# Patient Record
Sex: MALE
[De-identification: names, ages, dates, MRNs, and addresses within clinical notes are randomized per-mention and may not be internally consistent; named-entity substitution may affect disease eponyms.]

---

## 2019-06-04 ENCOUNTER — HOSPITAL ENCOUNTER (EMERGENCY)
Dept: HOSPITAL 5 - ED | Age: 29
LOS: 1 days | Discharge: HOME | End: 2019-06-05
Payer: SELF-PAY

## 2019-06-04 DIAGNOSIS — L03.317: Primary | ICD-10-CM

## 2019-06-04 DIAGNOSIS — L73.2: ICD-10-CM

## 2019-06-04 DIAGNOSIS — F17.200: ICD-10-CM

## 2019-06-04 DIAGNOSIS — L02.31: ICD-10-CM

## 2019-06-04 LAB
ALBUMIN SERPL-MCNC: 3.6 G/DL (ref 3.9–5)
ALT SERPL-CCNC: 17 UNITS/L (ref 7–56)
BASOPHILS # (AUTO): 0.1 K/MM3 (ref 0–0.1)
BASOPHILS NFR BLD AUTO: 0.6 % (ref 0–1.8)
BILIRUB DIRECT SERPL-MCNC: < 0.2 MG/DL (ref 0–0.2)
BUN SERPL-MCNC: 10 MG/DL (ref 9–20)
BUN/CREAT SERPL: 14 %
CALCIUM SERPL-MCNC: 9 MG/DL (ref 8.4–10.2)
EOSINOPHIL # BLD AUTO: 0.3 K/MM3 (ref 0–0.4)
EOSINOPHIL NFR BLD AUTO: 2 % (ref 0–4.3)
HCT VFR BLD CALC: 44.1 % (ref 35.5–45.6)
HEMOLYSIS INDEX: 14
HGB BLD-MCNC: 15.5 GM/DL (ref 11.8–15.2)
LYMPHOCYTES # BLD AUTO: 2.2 K/MM3 (ref 1.2–5.4)
LYMPHOCYTES NFR BLD AUTO: 17 % (ref 13.4–35)
MCHC RBC AUTO-ENTMCNC: 35 % (ref 32–34)
MCV RBC AUTO: 94 FL (ref 84–94)
MONOCYTES # (AUTO): 1.2 K/MM3 (ref 0–0.8)
MONOCYTES % (AUTO): 9.4 % (ref 0–7.3)
PLATELET # BLD: 226 K/MM3 (ref 140–440)
RBC # BLD AUTO: 4.71 M/MM3 (ref 3.65–5.03)

## 2019-06-04 PROCEDURE — 85025 COMPLETE CBC W/AUTO DIFF WBC: CPT

## 2019-06-04 PROCEDURE — 10060 I&D ABSCESS SIMPLE/SINGLE: CPT

## 2019-06-04 PROCEDURE — 80076 HEPATIC FUNCTION PANEL: CPT

## 2019-06-04 PROCEDURE — 96375 TX/PRO/DX INJ NEW DRUG ADDON: CPT

## 2019-06-04 PROCEDURE — 96365 THER/PROPH/DIAG IV INF INIT: CPT

## 2019-06-04 PROCEDURE — 36415 COLL VENOUS BLD VENIPUNCTURE: CPT

## 2019-06-04 PROCEDURE — 80048 BASIC METABOLIC PNL TOTAL CA: CPT

## 2019-06-04 PROCEDURE — 99283 EMERGENCY DEPT VISIT LOW MDM: CPT

## 2019-06-04 PROCEDURE — 87040 BLOOD CULTURE FOR BACTERIA: CPT

## 2019-06-04 NOTE — EMERGENCY DEPARTMENT REPORT
Chief Complaint: Skin/Abscess/Foreign Body


Stated Complaint: LT LEG PAIN (INFECTION)


Time Seen by Provider: 06/04/19 18:53





- HPI


History of Present Illness: 





This is a 29 y.o. M. that presents to the ER with an abscess to left buttock x 1

week.





Patient states he was incarcerated when it appeared.





He was taking antibiotics and pain medication.





Patient states it was improving while there but worse when he got and no longer 

taking antibiotics.


MSE screening note: 


Focused history and physical exam performed.


Due to findings the following was ordered:





This initial assessment/diagnostic orders/clinical plan/treatment(s) is/are 

subject to change based on patient's health status, clinical progression and re-

assessment by fellow clinical providers in the ED.  Further treatment and workup

at subsequent clinical providers discretion.  Patient/guardians urged not to 

elope from the ED as their condition may be serious if not clinically assessed 

and managed.  Initial orders include:





ACC for further evaluation.





ED Disposition for MSE


Condition: Stable

## 2019-06-05 VITALS — SYSTOLIC BLOOD PRESSURE: 106 MMHG | DIASTOLIC BLOOD PRESSURE: 70 MMHG

## 2019-06-05 NOTE — EMERGENCY DEPARTMENT REPORT
- General


Chief complaint: Skin/Abscess/Foreign Body


Stated complaint: LT LEG PAIN (INFECTION)


Time Seen by Provider: 06/04/19 18:53


Source: patient


Mode of arrival: Ambulatory


Limitations: No Limitations





- History of Present Illness


Initial comments: 





Patient is 29-year-old white male with no past medical history presents to the 

ED, an acute onset persistent severe pain and swelling and erythematous macular 

rash on his left buttocks the last 1 week possibly longer.  Patient states that 

in the last 2 days the swelling and the pain has worsened.  Patient denies 

dizziness, fever, chills, nausea, vomiting, numbness and tingling of lower 

extremities bilaterally, dizziness or abdominal pain.


MD complaint: rash, insect bite/sting, abscess/boil (buttocks)


-: Sudden, week(s) (1)


Tetanus Up to Date: yes


Location: buttocks (left)


Severity: severe


Severity scale (0 -10): 8


Quality: aching, sharp, constant


Consistency: constant


Improves with: none


Worsens with: palpation, movement


Context: other (unknown)


Associated symptoms: itching, malaise, athralgias


Treatments Prior to Arrival: none, attempted to drain pus at





- Related Data


                                  Previous Rx's











 Medication  Instructions  Recorded  Last Taken  Type


 


Acetaminophen/Codeine [Tylenol 1 tab PO Q6H PRN #15 tab 06/05/19 Unknown Rx





/Codeine # 3 tab]    


 


Clindamycin [Clindamycin CAP] 300 mg PO Q8HR #60 capsule 06/05/19 Unknown Rx


 


Ibuprofen [Motrin] 400 mg PO Q8H PRN #20 tablet 06/05/19 Unknown Rx


 


Ondansetron [Zofran Odt] 4 mg PO Q6HR #15 tab.rapdis 06/05/19 Unknown Rx


 


Sulfamethoxazole/Trimethoprim 1 each PO Q12H #20 tablet 06/05/19 Unknown Rx





[Bactrim DS TAB]    











                                    Allergies











Allergy/AdvReac Type Severity Reaction Status Date / Time


 


No Known Allergies Allergy   Unverified 06/04/19 22:16














Abscess Boil HPI





- HPI


Chief Complaint: Skin/Abscess/Foreign Body


Stated Complaint: LT LEG PAIN (INFECTION)


Time Seen by Provider: 06/04/19 18:53


Duration: >1 Week


Location: Other (left gluteus)


Severity: Severe


History: Yes Pain, Yes Purulent Drainage, Yes Previous History, No Fever, No 

Numbness, No Foreign Body, No Insect Bite


HPI: Patient is 29-year-old white male with no past medical history presents to 

the ED, an acute onset persistent severe pain and swelling and erythematous 

macular rash on his left buttocks the last 1 week possibly longer.  Patient 

states that in the last 2 days the swelling and the pain has worsened.  Patient 

denies dizziness, fever, chills, nausea, vomiting, numbness and tingling of 

lower extremities bilaterally, dizziness or abdominal pain.


Home Medications: 


                                  Previous Rx's











 Medication  Instructions  Recorded  Last Taken  Type


 


Acetaminophen/Codeine [Tylenol 1 tab PO Q6H PRN #15 tab 06/05/19 Unknown Rx





/Codeine # 3 tab]    


 


Clindamycin [Clindamycin CAP] 300 mg PO Q8HR #60 capsule 06/05/19 Unknown Rx


 


Ibuprofen [Motrin] 400 mg PO Q8H PRN #20 tablet 06/05/19 Unknown Rx


 


Ondansetron [Zofran Odt] 4 mg PO Q6HR #15 tab.rapdis 06/05/19 Unknown Rx


 


Sulfamethoxazole/Trimethoprim 1 each PO Q12H #20 tablet 06/05/19 Unknown Rx





[Bactrim DS TAB]    











Allergies/Adverse Reactions: 


                                    Allergies











Allergy/AdvReac Type Severity Reaction Status Date / Time


 


No Known Allergies Allergy   Unverified 06/04/19 22:16














ED Review of Systems


ROS: 


Stated complaint: LT LEG PAIN (INFECTION)


Other details as noted in HPI





Comment: All other systems reviewed and negative


Constitutional: no symptoms reported, see HPI, diaphoresis, malaise


Eyes: as per HPI.  denies: eye pain, eye discharge, vision change


ENT: as per HPI.  denies: ear pain, throat pain, dental pain, hearing loss, 

congestion


Respiratory: no symptoms reported, see HPI.  denies: cough, orthopnea, shortness

of breath, SOB with exertion, SOB at rest


Cardiovascular: as per HPI.  denies: chest pain, palpitations, dyspnea on 

exertion, edema, syncope, paroxysmal nocturnal dyspnea


Endocrine: no symptoms reported, see HPI.  denies: excessive sweating, flushing,

intolerance to cold, intolerance to heat, increased hunger, increased thirst, 

increased urine, unexplained weight gain


Gastrointestinal: as per HPI.  denies: abdominal pain, nausea, vomiting, 

diarrhea, constipation, hematemesis, melena, hematochezia


Genitourinary: as per HPI.  denies: urgency, dysuria, frequency, hematuria, 

discharge, testicular pain, testicular mass


Musculoskeletal: as per HPI, arthralgia, myalgia


Skin: as per HPI, rash, change in color (erythematous swollen fluctuant  rash), 

pruritus


Neurological: as per HPI.  denies: headache, weakness, numbness, paresthesias, 

confusion, abnormal gait, vertigo


Psychiatric: as per HPI.  denies: auditory hallucinations, visual 

hallucinations, homicidal thoughts


Hematological/Lymphatic: as per HPI





ED Past Medical Hx





- Past Medical History


Previous Medical History?: No





- Surgical History


Past Surgical History?: No





- Social History


Smoking Status: Current Every Day Smoker


Substance Use Type: None





- Medications


Home Medications: 


                                Home Medications











 Medication  Instructions  Recorded  Confirmed  Last Taken  Type


 


Acetaminophen/Codeine [Tylenol 1 tab PO Q6H PRN #15 tab 06/05/19  Unknown Rx





/Codeine # 3 tab]     


 


Clindamycin [Clindamycin CAP] 300 mg PO Q8HR #60 capsule 06/05/19  Unknown Rx


 


Ibuprofen [Motrin] 400 mg PO Q8H PRN #20 tablet 06/05/19  Unknown Rx


 


Ondansetron [Zofran Odt] 4 mg PO Q6HR #15 tab.rapdis 06/05/19  Unknown Rx


 


Sulfamethoxazole/Trimethoprim 1 each PO Q12H #20 tablet 06/05/19  Unknown Rx





[Bactrim DS TAB]     














ED Physical Exam





- General


Limitations: No Limitations


General appearance: alert, in no apparent distress





- Head


Head exam: Present: atraumatic, normocephalic, normal inspection





- Eye


Eye exam: Present: normal appearance, PERRL, EOMI


Pupils: Present: normal accommodation





- ENT


ENT exam: Present: normal exam, normal orophraynx, mucous membranes moist, TM's 

normal bilaterally, normal external ear exam





- Neck


Neck exam: Present: normal inspection, full ROM.  Absent: tenderness, 

meningismus, lymphadenopathy, thyromegaly





- Respiratory


Respiratory exam: Present: normal lung sounds bilaterally.  Absent: respiratory 

distress, wheezes, rales, rhonchi, chest wall tenderness, accessory muscle use, 

decreased breath sounds





- Cardiovascular


Cardiovascular Exam: Present: regular rate, normal heart sounds.  Absent: normal

rhythm, bradycardia, tachycardia, irregular rhythm, systolic murmur, diastolic 

murmur





- GI/Abdominal


GI/Abdominal exam: Present: soft, normal bowel sounds.  Absent: guarding, 

hyperactive bowel sounds





- Rectal


Rectal exam: Present: deferred





- Extremities Exam


Extremities exam: Present: normal inspection, full ROM, normal capillary refill.

 Absent: tenderness, pedal edema, joint swelling, calf tenderness, other





- Back Exam


Back exam: Present: normal inspection, full ROM.  Absent: tenderness, CVA 

tenderness (R), CVA tenderness (L), muscle spasm, paraspinal tenderness, 

vertebral tenderness





- Neurological Exam


Neurological exam: Present: alert, oriented X3, CN II-XII intact, normal gait, 

reflexes normal





- Psychiatric


Psychiatric exam: Present: normal affect, anxious





- Skin


Skin exam: Present: warm, dry, intact, rash (erythematous maculopapular 

fluctuant rash on the left gluteus), erythema





ED Course





- Reevaluation(s)


Reevaluation #1: 





06/05/19 00:33


Patient is alert and oriented 3 and is not in distress with normal vital signs.

 Patient was treated for pain and labs were drawn.  Patient received clindamycin

900 mg IV 1 with pain medications.  The left gluteus abscess was drained and 

packed, and patient tolerated the procedure well.  Lab test results were 

reviewed and are significant for leukocytosis of 13,200, and the rest of the lab

test results adamant nonactionable. Patient was discharged home on pain 

medications and antibiotics and advised to follow-up with his primary care 

physician in 7-10 days for reevaluation.  Patient is advised to return to the ED

immediately if symptoms get worse, otherwise return to the ED in 2 days for 

packing removal.





06/05/19 00:34








- I & D


  ** Left Buttocks


Type of Procedure: Simple


Site: left gluteus


Blade Size: 11


I & D Procedure: betadine prep, sterile drapes applied, sterile dressing applied


Progress: 





The site was cleaned thoroughly and sterilized protocol.  There was anesthetized

with lidocaine 1% solution that she typically unassociated in the local area.  

The abscess wound was incised and drained thoroughly, braking and debris and 

dead tissue was, and flushed with normal saline solution.  The wound was then 

packed with iodoform gauze, and dressed appropriately.  Patient was discharged 

home on pain medications and antibiotics and advised to return to the ED in 2 

days for wound recheck and packing removal.  Patient tolerated procedure well.





ED Medical Decision Making





- Lab Data


Result diagrams: 


                                 06/04/19 22:20





                                 06/04/19 22:20





- Medical Decision Making





Patient is alert and oriented 3 and is not in distress with normal vital signs.

 Patient was treated for pain and labs were drawn.  Patient received clindamycin

900 mg IV 1 with pain medications.  The left gluteus abscess was drained and 

packed, and patient tolerated the procedure well.  Lab test results were 

reviewed and are significant for leukocytosis of 13,200, and the rest of the lab

test results adamant nonactionable. Patient was discharged home on pain 

medications and antibiotics and advised to follow-up with his primary care 

physician in 7-10 days for reevaluation.  Patient is advised to return to the ED

immediately if symptoms get worse, otherwise return to the ED in 2 days for 

packing removal.








- Differential Diagnosis


cellulitis, cutaneous abscess of buttock


Critical care attestation.: 


If time is entered above; I have spent that time in minutes in the direct care 

of this critically ill patient, excluding procedure time.








ED Disposition


Clinical Impression: 


 Cellulitis and abscess of buttock, Hidradenitis suppurativa





Disposition: DC-01 TO HOME OR SELFCARE


Is pt being admited?: No


Does the pt Need Aspirin: No


Condition: Stable


Instructions:  Cellulitis (ED), Abscess (ED)


Additional Instructions: 


Take medications with food, drink plenty of fluids and follow up with your 

primary care physician is advised in 7-10 days.  Return to the ED in 2 days for 

wound recheck and packing removal.  Otherwise return to the ED immediately if 

symptoms get worse.


Prescriptions: 


Sulfamethoxazole/Trimethoprim [Bactrim DS TAB] 1 each PO Q12H #20 tablet


Clindamycin [Clindamycin CAP] 300 mg PO Q8HR #60 capsule


Ibuprofen [Motrin] 400 mg PO Q8H PRN #20 tablet


 PRN Reason: Pain , Severe (7-10)


Acetaminophen/Codeine [Tylenol /Codeine # 3 tab] 1 tab PO Q6H PRN #15 tab


 PRN Reason: Pain , Severe (7-10)


Ondansetron [Zofran Odt] 4 mg PO Q6HR #15 tab.rapdis


Referrals: 


[facility] [[facility] Provider[facility] - 3-5 Days


Time of Disposition: 00:41


Print Language: ENGLISH